# Patient Record
(demographics unavailable — no encounter records)

---

## 2025-07-17 NOTE — REVIEW OF SYSTEMS
[Negative] : Heme/Lymph [Lower Ext Edema] : lower extremity edema [As Noted in HPI] : as noted in HPI [Arthralgias] : arthralgias [Joint Swelling] : joint swelling [Joint Stiffness] : joint stiffness

## 2025-07-21 NOTE — ADDENDUM
[FreeTextEntry1] : I, Dr. James Lamar, personally performed the evaluation and management (E/M) services for this new patient.  That E/M includes conducting the initial examination, assessing all conditions, and establishing the plan of care.  Today, my ACP, Frida Piedra NP, was here to observe my evaluation and management services for this patient to be followed going forward.  normal

## 2025-07-21 NOTE — ADDENDUM
[FreeTextEntry1] : I, Dr. James Lamar, personally performed the evaluation and management (E/M) services for this new patient.  That E/M includes conducting the initial examination, assessing all conditions, and establishing the plan of care.  Today, my ACP, Frida Piedra NP, was here to observe my evaluation and management services for this patient to be followed going forward.

## 2025-07-21 NOTE — PHYSICAL EXAM
[Respiratory Effort] : normal respiratory effort [No Rash or Lesion] : No rash or lesion [Alert] : alert [Oriented to Person] : oriented to person [Oriented to Place] : oriented to place [Oriented to Time] : oriented to time [Calm] : calm [2+] : left 2+ [Ankle Swelling (On Exam)] : present [Ankle Swelling Bilaterally] : severe [JVD] : no jugular venous distention  [Varicose Veins Of Lower Extremities] : not present [] : not present [de-identified] : WN/WD [FreeTextEntry1] :  egs well perfused with pitting edema on both legs LLE>RLE. Skin intact. No large varices. L calf distally w/ faint erythema but not hot to touch c/w dermatitis as it resolves w/ elevation. Morbidly obese body habitus, large pannus and pendulous thighs.  [de-identified] : FROM, slightly limited R knee 2/2 pain

## 2025-07-21 NOTE — ASSESSMENT
[Arterial/Venous Disease] : arterial/venous disease [FreeTextEntry1] : 80 y/o F w/ chronic bilt LE edema, pitting. Underlying R knee severe arthritis, morbid obesity (BMI 42.51) and chronic CHF.   On exam, legs well perfused with pitting edema on both legs LLE>RLE. Skin intact. No large varices. L calf distally w/ faint erythema but not hot to touch c/w dermatitis as it resolves w/ elevation. Morbidly obese body habitus, large pannus and pendulous thighs.  Venous duplex showed bilt neg DVT or SVT. No reflux.  Findings discussed with pt and strongly encouraged to pursue wgt loss measures. Wgt loss will greatly alleviate her leg symptoms along w/ overall health benefits. Daily use of compression stockings (new prescription provided 15-20mmHg). Moisturize daily the skin using mineral oil, Aquaphor ointment and/or vitamin A&D ointment after washing w/ soap/water. Avoid prolonged periods of time with legs in dependent position. Recommend leg elevation above the level of the heart while resting. F/u prn.

## 2025-07-21 NOTE — HISTORY OF PRESENT ILLNESS
[FreeTextEntry1] : 78 y/o F referred by Dr Emi Ward. She has a PMHx of HTN, v-tach, PPM, morbid obesity (BMI 42.51), atherosclerosis of aorta, HLD, brain aneurysm, HAs, emphysema, cataracts, rhinitis, chronic CHF, osteoarthritis, [id number], and chronic, bilateral leg swelling. She presents for eval of venous insufficiency and varicose veins. She has had an US which excluded deep or superficial thrombosis but no reflux or insuff was ruled out. Baker cyst was also appreciated.  She reports the legs have been swollen for a few yrs, LLE>RLE. She also points out the L leg has a reddish look to it. Denies any wounds, palpable cords, hx of DVT/SVT, fever/chills. She uses a rolling walker given significant R knee arthritis and unfortunately has a very sedentary lifestyle. She sits for long periods of time w/ legs in dependent position. During the summertime, she walks even less given the hot weather. She reports having an echo a few months ago and reports being told it was stable. She is of daily Lasix and has been on amlodipine for many yrs. She has SOB w/ exertion which also limits her activity level.   SHx: -Retired -Former smoker  FHx: -CAD, [place]

## 2025-07-21 NOTE — PROCEDURE
[FreeTextEntry1] :  Venous duplex ordered to r/o insuff and eval vv, shows: bilt neg DVT or SVT. No reflux.

## 2025-07-21 NOTE — ASSESSMENT
[Arterial/Venous Disease] : arterial/venous disease [FreeTextEntry1] : 78 y/o F w/ chronic bilt LE edema, pitting. Underlying R knee severe arthritis, morbid obesity (BMI 42.51) and chronic CHF.   On exam, legs well perfused with pitting edema on both legs LLE>RLE. Skin intact. No large varices. L calf distally w/ faint erythema but not hot to touch c/w dermatitis as it resolves w/ elevation. Morbidly obese body habitus, large pannus and pendulous thighs.  Venous duplex showed bilt neg DVT or SVT. No reflux.  Findings discussed with pt and strongly encouraged to pursue wgt loss measures. Wgt loss will greatly alleviate her leg symptoms along w/ overall health benefits. Daily use of compression stockings (new prescription provided 15-20mmHg). Moisturize daily the skin using mineral oil, Aquaphor ointment and/or vitamin A&D ointment after washing w/ soap/water. Avoid prolonged periods of time with legs in dependent position. Recommend leg elevation above the level of the heart while resting. F/u prn.

## 2025-07-21 NOTE — CONSULT LETTER
[Dear  ___] : Dear  [unfilled], [FreeTextEntry2] : Emi Ward MD [address] [place] [address]  Jodie Fletcher MD [address] [place] [address] [FreeTextEntry1] : Thank you for referring Ms Maria Elena Jaramillo. She reports chronic leg edema for several years. She had an ultrasound which was negative for DVT.  She denies any fever, chills, wounds, large varicose veins, cellulitis. She sits for long periods of time and has a sedentary lifestyle.   On exam, legs well perfused with pitting edema on both legs (left worse than right leg). Skin intact. No large varices. On the left calf, distally there is very faint erythema but leg is not warm to touch.  The erythema resolves with elevation, consistent with stasis dermatitis secondary to leg edema.  She has a morbidly obese body habitus with large pannus and pendulous thighs.   Venous duplex of both legs was negative for any thrombosis.  In addition, there is no evidence of deep or superficial venous insufficiency.   I had a long discussion with Clint. There is no evidence of venous insufficiency. She has pitting edema which is attributed to weight and possibly also her cardiac condition. I greatly encouraged her to work on her weight which will significantly alleviate her leg symptoms and improve her overall health. She may see me as needed.   My complete EMR office note is below for your records.  [FreeTextEntry3] : Sincerely,   James Lamar M.D.  , Surgical Services [facility] Surgeon-in-Chief, [facility] Professor of Surgery, [employer] at [facility]

## 2025-07-21 NOTE — PHYSICAL EXAM
[Respiratory Effort] : normal respiratory effort [No Rash or Lesion] : No rash or lesion [Alert] : alert [Oriented to Person] : oriented to person [Oriented to Place] : oriented to place [Oriented to Time] : oriented to time [Calm] : calm [2+] : left 2+ [Ankle Swelling (On Exam)] : present [Ankle Swelling Bilaterally] : severe [JVD] : no jugular venous distention  [Varicose Veins Of Lower Extremities] : not present [] : not present [de-identified] : WN/WD [FreeTextEntry1] :  egs well perfused with pitting edema on both legs LLE>RLE. Skin intact. No large varices. L calf distally w/ faint erythema but not hot to touch c/w dermatitis as it resolves w/ elevation. Morbidly obese body habitus, large pannus and pendulous thighs.  [de-identified] : FROM, slightly limited R knee 2/2 pain

## 2025-07-21 NOTE — HISTORY OF PRESENT ILLNESS
[FreeTextEntry1] : 78 y/o F referred by Dr Emi Ward. She has a PMHx of HTN, v-tach, PPM, morbid obesity (BMI 42.51), atherosclerosis of aorta, HLD, brain aneurysm, HAs, emphysema, cataracts, rhinitis, chronic CHF, osteoarthritis, [id number], and chronic, bilateral leg swelling. She presents for eval of venous insufficiency and varicose veins. She has had an US which excluded deep or superficial thrombosis but no reflux or insuff was ruled out. Baker cyst was also appreciated.  She reports the legs have been swollen for a few yrs, LLE>RLE. She also points out the L leg has a reddish look to it. Denies any wounds, palpable cords, hx of DVT/SVT, fever/chills. She uses a rolling walker given significant R knee arthritis and unfortunately has a very sedentary lifestyle. She sits for long periods of time w/ legs in dependent position. During the summertime, she walks even less given the hot weather. She reports having an echo a few months ago and reports being told it was stable. She is of daily Lasix and has been on amlodipine for many yrs. She has SOB w/ exertion which also limits her activity level.   SHx: -Retired -Former smoker  FHx: -CAD, [place]

## 2025-07-21 NOTE — CONSULT LETTER
[Dear  ___] : Dear  [unfilled], [FreeTextEntry2] : Emi Ward MD [address] [place] [address]  Jodie Fletcher MD [address] [place] [address] [FreeTextEntry1] : Thank you for referring Ms Maria Elena Jaramillo. She reports chronic leg edema for several years. She had an ultrasound which was negative for DVT.  She denies any fever, chills, wounds, large varicose veins, cellulitis. She sits for long periods of time and has a sedentary lifestyle.   On exam, legs well perfused with pitting edema on both legs (left worse than right leg). Skin intact. No large varices. On the left calf, distally there is very faint erythema but leg is not warm to touch.  The erythema resolves with elevation, consistent with stasis dermatitis secondary to leg edema.  She has a morbidly obese body habitus with large pannus and pendulous thighs.   Venous duplex of both legs was negative for any thrombosis.  In addition, there is no evidence of deep or superficial venous insufficiency.   I had a long discussion with Clint. There is no evidence of venous insufficiency. She has pitting edema which is attributed to weight and possibly also her cardiac condition. I greatly encouraged her to work on her weight which will significantly alleviate her leg symptoms and improve her overall health. She may see me as needed.   My complete EMR office note is below for your records.  [FreeTextEntry3] : Sincerely,   James Lamar M.D.  , Surgical Services [facility] Surgeon-in-Chief, [facility] Professor of Surgery, [employer] at [facility]

## 2025-07-21 NOTE — CONSULT LETTER
[Dear  ___] : Dear  [unfilled], [FreeTextEntry2] : Emi Ward MD [address] [place] [address]  Jodie Fletcher MD [address] [place] [address] [FreeTextEntry1] : Thank you for referring Ms Maria Elena Jaramillo. She reports chronic leg edema for several years. She had an ultrasound which was negative for DVT.  She denies any fever, chills, wounds, large varicose veins, cellulitis. She sits for long periods of time and has a sedentary lifestyle.   On exam, legs well perfused with pitting edema on both legs (left worse than right leg). Skin intact. No large varices. On the left calf, distally there is very faint erythema but leg is not warm to touch.  The erythema resolves with elevation, consistent with stasis dermatitis secondary to leg edema.  She has a morbidly obese body habitus with large pannus and pendulous thighs.   Venous duplex of both legs was negative for any thrombosis.  In addition, there is no evidence of deep or superficial venous insufficiency.   I had a long discussion with Clint. There is no evidence of venous insufficiency. She has pitting edema which is attributed to weight and possibly also her cardiac condition. I greatly encouraged her to work on her weight which will significantly alleviate her leg symptoms and improve her overall health. She may see me as needed.   My complete EMR office note is below for your records.  [FreeTextEntry3] : Sincerely,   James Lamar M.D.  , Surgical Services [facility] Surgeon-in-Chief, [facility] Professor of Surgery, [employer] at [facility]

## 2025-07-21 NOTE — HISTORY OF PRESENT ILLNESS
[FreeTextEntry1] : 80 y/o F referred by Dr Emi Ward. She has a PMHx of HTN, v-tach, PPM, morbid obesity (BMI 42.51), atherosclerosis of aorta, HLD, brain aneurysm, HAs, emphysema, cataracts, rhinitis, chronic CHF, osteoarthritis, [id number], and chronic, bilateral leg swelling. She presents for eval of venous insufficiency and varicose veins. She has had an US which excluded deep or superficial thrombosis but no reflux or insuff was ruled out. Baker cyst was also appreciated.  She reports the legs have been swollen for a few yrs, LLE>RLE. She also points out the L leg has a reddish look to it. Denies any wounds, palpable cords, hx of DVT/SVT, fever/chills. She uses a rolling walker given significant R knee arthritis and unfortunately has a very sedentary lifestyle. She sits for long periods of time w/ legs in dependent position. During the summertime, she walks even less given the hot weather. She reports having an echo a few months ago and reports being told it was stable. She is of daily Lasix and has been on amlodipine for many yrs. She has SOB w/ exertion which also limits her activity level.   SHx: -Retired -Former smoker  FHx: -CAD, [place]